# Patient Record
Sex: FEMALE | Race: ASIAN | URBAN - METROPOLITAN AREA
[De-identification: names, ages, dates, MRNs, and addresses within clinical notes are randomized per-mention and may not be internally consistent; named-entity substitution may affect disease eponyms.]

---

## 2023-09-25 ENCOUNTER — APPOINTMENT (OUTPATIENT)
Dept: GASTROENTEROLOGY | Age: 78
End: 2023-09-25
Attending: INTERNAL MEDICINE

## 2023-11-29 ENCOUNTER — OFFICE VISIT (OUTPATIENT)
Dept: PODIATRY CLINIC | Facility: CLINIC | Age: 78
End: 2023-11-29

## 2023-11-29 DIAGNOSIS — M79.674 PAIN DUE TO ONYCHOMYCOSIS OF TOENAILS OF BOTH FEET: ICD-10-CM

## 2023-11-29 DIAGNOSIS — B35.1 ONYCHOMYCOSIS: Primary | ICD-10-CM

## 2023-11-29 DIAGNOSIS — B35.1 PAIN DUE TO ONYCHOMYCOSIS OF TOENAILS OF BOTH FEET: ICD-10-CM

## 2023-11-29 DIAGNOSIS — M79.675 PAIN DUE TO ONYCHOMYCOSIS OF TOENAILS OF BOTH FEET: ICD-10-CM

## 2023-11-29 PROCEDURE — 99203 OFFICE O/P NEW LOW 30 MIN: CPT | Performed by: PODIATRIST

## 2023-12-08 ENCOUNTER — TELEPHONE (OUTPATIENT)
Dept: ORTHOPEDICS CLINIC | Facility: CLINIC | Age: 78
End: 2023-12-08

## 2023-12-08 DIAGNOSIS — B35.1 ONYCHOMYCOSIS: Primary | ICD-10-CM

## 2023-12-08 NOTE — TELEPHONE ENCOUNTER
----- Message from Elina Valentine DPM sent at 12/4/2023  5:36 PM CST -----  Results reviewed. Please inform patient that fungal culture results are positive and we should proceed with Lamisil tablet therapy. If the patient agrees we have to do a hepatic function panel, please place that order for me with a diagnosis of onychomycosis.

## 2023-12-09 NOTE — PROGRESS NOTES
Corinne Adair is a 69year old female. Chief Complaint   Patient presents with    New Patient     Consult - R foot- Pt states pain in 3rd and 4th toes. Rates pain 8. Denies any injury. HPI:   This pleasant patient presents to the clinic with her  she is basically complaining of pain in the third and fourth toes pain can be very bad. At today's visit reviewed nurse's history as taken above, allergies medications and medical history as documented below. All changes duly noted  Allergies: Patient has no allergy information on record. No current outpatient medications on file. History reviewed. No pertinent past medical history. History reviewed. No pertinent surgical history. History reviewed. No pertinent family history. Social History     Socioeconomic History    Marital status:            REVIEW OF SYSTEMS:   Today reviewed systens as documented below  GENERAL HEALTH: feels well otherwise  SKIN: Refer to exam below  RESPIRATORY: denies shortness of breath with exertion  CARDIOVASCULAR: denies chest pain on exertion  GI: denies abdominal pain and denies heartburn  NEURO: denies headaches    EXAM:   There were no vitals taken for this visit. GENERAL: well developed, well nourished, in no apparent distress  EXTREMITIES:   1. Integument: The skin on her feet is warm and dry there is some desquamation of skin plantarly. Third and fourth toenails really on both feet but mostly on the right are sticking straight up and they are thickened and dystrophic characteristic of not only onychomycosis but onychogryphosis   2. Vascular: Has palpable pulses   3. Neurologic: Patient has intact sensorium   4. Musculoskeletal: He has good muscle strength. ASSESSMENT AND PLAN:   Diagnoses and all orders for this visit:    Onychomycosis  -     Dermatophyte Only, Culture [E];  Future    Pain due to onychomycosis of toenails of both feet        Plan: Trimmed down and debrided toenails 1-5 manually mechanically on both views for known healthy tissue as possible took a clipping of one of the most affected toenails for fungal culture I did discuss treatment with both her and her . Will call them with the results of the fungal culture. I recommended Lamisil tablets also recommended routine trimming nail removal is also an option. The patient indicates understanding of these issues and agrees to the plan.     Joanne Gale DPM